# Patient Record
Sex: MALE | Race: WHITE | NOT HISPANIC OR LATINO | URBAN - METROPOLITAN AREA
[De-identification: names, ages, dates, MRNs, and addresses within clinical notes are randomized per-mention and may not be internally consistent; named-entity substitution may affect disease eponyms.]

---

## 2019-11-19 ENCOUNTER — HOSPITAL ENCOUNTER (OUTPATIENT)
Dept: RADIOLOGY | Facility: HOSPITAL | Age: 29
Discharge: HOME | End: 2019-11-19
Attending: INTERNAL MEDICINE

## 2022-06-29 ENCOUNTER — OCCUPATIONAL HEALTH (OUTPATIENT)
Dept: URGENT CARE | Facility: CLINIC | Age: 32
End: 2022-06-29

## 2022-06-29 PROCEDURE — 80305 DRUG TEST PRSMV DIR OPT OBS: CPT | Mod: S$GLB,,, | Performed by: EMERGENCY MEDICINE

## 2022-06-29 PROCEDURE — 80305 PR COLLECTION ONLY DRUG SCREEN: ICD-10-PCS | Mod: S$GLB,,, | Performed by: EMERGENCY MEDICINE

## 2023-12-28 ENCOUNTER — OFFICE VISIT (OUTPATIENT)
Dept: URGENT CARE | Facility: CLINIC | Age: 33
End: 2023-12-28
Payer: COMMERCIAL

## 2023-12-28 VITALS
WEIGHT: 241 LBS | TEMPERATURE: 100 F | DIASTOLIC BLOOD PRESSURE: 90 MMHG | SYSTOLIC BLOOD PRESSURE: 141 MMHG | HEART RATE: 118 BPM | HEIGHT: 70 IN | RESPIRATION RATE: 20 BRPM | BODY MASS INDEX: 34.5 KG/M2 | OXYGEN SATURATION: 96 %

## 2023-12-28 DIAGNOSIS — Z87.09 HISTORY OF ASTHMA: ICD-10-CM

## 2023-12-28 DIAGNOSIS — R06.02 SHORTNESS OF BREATH: Primary | ICD-10-CM

## 2023-12-28 DIAGNOSIS — Z87.01 HISTORY OF PNEUMONIA: ICD-10-CM

## 2023-12-28 DIAGNOSIS — J10.1 INFLUENZA B: ICD-10-CM

## 2023-12-28 LAB
CTP QC/QA: YES
CTP QC/QA: YES
FLUAV AG NPH QL: NEGATIVE
FLUBV AG NPH QL: POSITIVE
SARS-COV-2 AG RESP QL IA.RAPID: NEGATIVE

## 2023-12-28 PROCEDURE — 99205 OFFICE O/P NEW HI 60 MIN: CPT | Mod: 25,S$GLB,, | Performed by: STUDENT IN AN ORGANIZED HEALTH CARE EDUCATION/TRAINING PROGRAM

## 2023-12-28 PROCEDURE — 94640 AIRWAY INHALATION TREATMENT: CPT | Mod: S$GLB,,, | Performed by: STUDENT IN AN ORGANIZED HEALTH CARE EDUCATION/TRAINING PROGRAM

## 2023-12-28 PROCEDURE — 93000 ELECTROCARDIOGRAM COMPLETE: CPT | Mod: S$GLB,,, | Performed by: STUDENT IN AN ORGANIZED HEALTH CARE EDUCATION/TRAINING PROGRAM

## 2023-12-28 PROCEDURE — 94640 PR INHAL RX, AIRWAY OBST/DX SPUTUM INDUCT: ICD-10-PCS | Mod: S$GLB,,, | Performed by: STUDENT IN AN ORGANIZED HEALTH CARE EDUCATION/TRAINING PROGRAM

## 2023-12-28 PROCEDURE — 87804 INFLUENZA ASSAY W/OPTIC: CPT | Mod: QW,,, | Performed by: STUDENT IN AN ORGANIZED HEALTH CARE EDUCATION/TRAINING PROGRAM

## 2023-12-28 PROCEDURE — 87811 SARS-COV-2 COVID19 W/OPTIC: CPT | Mod: QW,S$GLB,, | Performed by: STUDENT IN AN ORGANIZED HEALTH CARE EDUCATION/TRAINING PROGRAM

## 2023-12-28 PROCEDURE — 93000 PR ELECTROCARDIOGRAM, COMPLETE: ICD-10-PCS | Mod: S$GLB,,, | Performed by: STUDENT IN AN ORGANIZED HEALTH CARE EDUCATION/TRAINING PROGRAM

## 2023-12-28 PROCEDURE — 87804 POCT INFLUENZA A/B: ICD-10-PCS | Mod: QW,,, | Performed by: STUDENT IN AN ORGANIZED HEALTH CARE EDUCATION/TRAINING PROGRAM

## 2023-12-28 PROCEDURE — 87811 SARS CORONAVIRUS 2 ANTIGEN POCT, MANUAL READ: ICD-10-PCS | Mod: QW,S$GLB,, | Performed by: STUDENT IN AN ORGANIZED HEALTH CARE EDUCATION/TRAINING PROGRAM

## 2023-12-28 PROCEDURE — 99205 PR OFFICE/OUTPT VISIT, NEW, LEVL V, 60-74 MIN: ICD-10-PCS | Mod: 25,S$GLB,, | Performed by: STUDENT IN AN ORGANIZED HEALTH CARE EDUCATION/TRAINING PROGRAM

## 2023-12-28 RX ORDER — PREDNISONE 20 MG/1
40 TABLET ORAL DAILY
Qty: 10 TABLET | Refills: 0 | Status: SHIPPED | OUTPATIENT
Start: 2023-12-29 | End: 2024-01-03

## 2023-12-28 RX ORDER — FAMOTIDINE 20 MG/1
20 TABLET, FILM COATED ORAL
COMMUNITY
Start: 2023-09-29 | End: 2024-09-28

## 2023-12-28 RX ORDER — AZITHROMYCIN 250 MG/1
TABLET, FILM COATED ORAL
Qty: 6 TABLET | Refills: 0 | Status: SHIPPED | OUTPATIENT
Start: 2023-12-28 | End: 2024-01-02

## 2023-12-28 RX ORDER — ALBUTEROL SULFATE 0.83 MG/ML
2.5 SOLUTION RESPIRATORY (INHALATION)
Status: COMPLETED | OUTPATIENT
Start: 2023-12-28 | End: 2023-12-28

## 2023-12-28 RX ORDER — IPRATROPIUM BROMIDE AND ALBUTEROL SULFATE 2.5; .5 MG/3ML; MG/3ML
3 SOLUTION RESPIRATORY (INHALATION) EVERY 6 HOURS PRN
Qty: 75 ML | Refills: 0 | Status: SHIPPED | OUTPATIENT
Start: 2023-12-28

## 2023-12-28 RX ORDER — IPRATROPIUM BROMIDE 0.5 MG/2.5ML
0.5 SOLUTION RESPIRATORY (INHALATION)
Status: COMPLETED | OUTPATIENT
Start: 2023-12-28 | End: 2023-12-28

## 2023-12-28 RX ORDER — PROMETHAZINE HYDROCHLORIDE AND DEXTROMETHORPHAN HYDROBROMIDE 6.25; 15 MG/5ML; MG/5ML
5 SYRUP ORAL
Qty: 118 ML | Refills: 0 | Status: SHIPPED | OUTPATIENT
Start: 2023-12-28 | End: 2024-01-07

## 2023-12-28 RX ORDER — ACETAMINOPHEN 500 MG
1000 TABLET ORAL
Status: COMPLETED | OUTPATIENT
Start: 2023-12-28 | End: 2023-12-28

## 2023-12-28 RX ORDER — CIPROFLOXACIN 500 MG/1
500 TABLET ORAL 2 TIMES DAILY
COMMUNITY
Start: 2023-10-13

## 2023-12-28 RX ADMIN — IPRATROPIUM BROMIDE 0.5 MG: 0.5 SOLUTION RESPIRATORY (INHALATION) at 02:12

## 2023-12-28 RX ADMIN — Medication 1000 MG: at 02:12

## 2023-12-28 RX ADMIN — ALBUTEROL SULFATE 2.5 MG: 0.83 SOLUTION RESPIRATORY (INHALATION) at 02:12

## 2023-12-28 NOTE — PROGRESS NOTES
"Subjective:      Patient ID: Jesse Francisco is a 33 y.o. male.    Vitals:  height is 5' 10" (1.778 m) and weight is 109.3 kg (241 lb). His temperature is 100.1 °F (37.8 °C). His blood pressure is 141/90 (abnormal) and his pulse is 118 (abnormal). His respiration is 20 and oxygen saturation is 96%.     Chief Complaint: Shortness of Breath    Patient is a 33-year-old male with a past medical history of childhood asthma, nephrolithiasis, and pneumonia who presents to clinic for evaluation of shortness a breath.  Patient reports symptoms intermittent over the past 3-4 days however states fairly constant as of this morning.  Patient reports no recent or known sick exposures however was at a [place] get together with family.  Patient states he believes the since and plugging triggered him to have worsening allergies.  Patient states that he started having congestion and postnasal drainage.  Patient states started taking over-the-counter medications such as antihistamines to get ahead of this.  Patient states woke up the following day feeling better however yesterday the symptoms returned.  Patient states that he has a history of bilateral pneumonia approximately 2 years ago and concerned that he may develop into this again. Patient states that he has experienced fatigue, chills, fever, nasal congestion with postnasal drainage, dry cough, shortness breast arrest, dyspnea on exertion, and headaches.  Patient states never really noticed high fever readings at home however noticed it was 102.1° F here.  Patient states has had chills at home.  Patient states that he has not had any ear pain or sore throat, chest pain or palpitations, leg swelling, wheezing, abdominal pain, nausea or vomiting, diarrhea, back pain or body aches, dizziness, or change in mentation.    Shortness of Breath  This is a new problem. The current episode started in the past 7 days (x's 3 days). The problem has been gradually worsening. Associated " symptoms include a fever (Temperature max 102.1° F) and headaches. Pertinent negatives include no abdominal pain, chest pain, ear pain, leg swelling, orthopnea, rash, sore throat, sputum production, vomiting or wheezing. Treatments tried: neb txts and inhalor. The treatment provided no relief. His past medical history is significant for asthma and bronchiolitis.       Constitution: Positive for chills, fatigue and fever (Temperature max 102.1° F).   HENT:  Positive for congestion and postnasal drip. Negative for ear pain and sore throat.    Neck: neck negative.   Cardiovascular:  Positive for sob on exertion. Negative for chest pain, leg swelling and palpitations.   Eyes: Negative.    Respiratory:  Positive for cough (Occasional) and shortness of breath. Negative for chest tightness, sputum production and wheezing.    Gastrointestinal: Negative.  Negative for abdominal pain, nausea, vomiting and diarrhea.   Endocrine: negative.   Genitourinary: Negative.    Musculoskeletal: Negative.  Negative for muscle ache.   Skin: Negative.  Negative for color change, pale, rash and erythema.   Allergic/Immunologic: Negative.    Neurological:  Positive for headaches. Negative for dizziness, light-headedness, passing out, disorientation and altered mental status.   Hematologic/Lymphatic: Negative.    Psychiatric/Behavioral: Negative.  Negative for altered mental status, disorientation and confusion.       Objective:     Physical Exam   Constitutional: He is oriented to person, place, and time. He appears well-developed. He is cooperative.  Non-toxic appearance. He appears ill. No distress.   HENT:   Head: Normocephalic and atraumatic.   Ears:   Right Ear: Hearing, tympanic membrane, external ear and ear canal normal.   Left Ear: Hearing, tympanic membrane, external ear and ear canal normal.   Nose: Congestion present. No mucosal edema, rhinorrhea or nasal deformity. No epistaxis. Right sinus exhibits no maxillary sinus tenderness  and no frontal sinus tenderness. Left sinus exhibits no maxillary sinus tenderness and no frontal sinus tenderness.   Mouth/Throat: Uvula is midline and mucous membranes are normal. Mucous membranes are moist. No trismus in the jaw. Normal dentition. No uvula swelling. Posterior oropharyngeal erythema present. No oropharyngeal exudate. Oropharynx is clear.   Eyes: Conjunctivae and lids are normal. Pupils are equal, round, and reactive to light. Right eye exhibits no discharge. Left eye exhibits no discharge. No scleral icterus.   Neck: Trachea normal and phonation normal. Neck supple. No neck rigidity present.   Cardiovascular: Regular rhythm, normal heart sounds and normal pulses. Tachycardia present.   Pulmonary/Chest: Effort normal. Tachypnea noted. No respiratory distress (Able to speak in short phrases.  Equal rise and fall of chest.  Mildly diminished breath sounds.). He has decreased breath sounds. He has no wheezes. He has no rhonchi. He has no rales.   Abdominal: Normal appearance and bowel sounds are normal. He exhibits no distension. Soft. There is no abdominal tenderness.   Musculoskeletal: Normal range of motion.         General: Normal range of motion.      Cervical back: He exhibits no tenderness.   Lymphadenopathy:     He has no cervical adenopathy.   Neurological: He is alert and oriented to person, place, and time. He exhibits normal muscle tone.   Skin: Skin is warm, dry, intact, not diaphoretic, not pale and no rash. Capillary refill takes 2 to 3 seconds. No erythema   Psychiatric: His speech is normal and behavior is normal. Judgment and thought content normal.   Nursing note and vitals reviewed.      Assessment:     1. Shortness of breath    2. Influenza B    3. History of pneumonia    4. History of asthma        Plan:       Shortness of breath  -     EKG 12-lead; Future  -     SARS Coronavirus 2 Antigen, POCT Manual Read  -     POCT Influenza A/B Rapid Antigen  -     XR CHEST PA AND LATERAL;  Future; Expected date: 12/28/2023    Influenza B    History of pneumonia  -     XR CHEST PA AND LATERAL; Future; Expected date: 12/28/2023    History of asthma  -     XR CHEST PA AND LATERAL; Future; Expected date: 12/28/2023    Other orders  -     acetaminophen tablet 1,000 mg  -     albuterol nebulizer solution 2.5 mg  -     ipratropium 0.02 % nebulizer solution 0.5 mg  -     azithromycin (Z-VIC) 250 MG tablet; Take 2 tablets by mouth on day 1; Take 1 tablet by mouth on days 2-5  Dispense: 6 tablet; Refill: 0  -     predniSONE (DELTASONE) 20 MG tablet; Take 2 tablets (40 mg total) by mouth once daily. for 5 days  Dispense: 10 tablet; Refill: 0  -     albuterol-ipratropium (DUO-NEB) 2.5 mg-0.5 mg/3 mL nebulizer solution; Take 3 mLs by nebulization every 6 (six) hours as needed for Wheezing or Shortness of Breath. Rescue  Dispense: 75 mL; Refill: 0  -     promethazine-dextromethorphan (PROMETHAZINE-DM) 6.25-15 mg/5 mL Syrp; Take 5 mLs by mouth every 4 to 6 hours as needed (Cough).  Dispense: 118 mL; Refill: 0                Labs: Influenza A negative.  Influenza B positive.  COVID negative.    Sinus tachycardia rate of 122 beats per minute.   MS, QRS 94 MS,  MS.  No acute STEMI identified.  Tylenol 1 g by mouth in clinic for fever.  Patient tolerated well.  No complications noted.    DuoNeb administered in clinic for tachypnea and shortness breath.  Patient tolerated well.  No complications noted.  Decadron 10 mg IM in clinic.  Patient tolerated well.  No complications noted.  Reassessment reveals improvement with vital signs and respiratory status.  Patient now able to speak in full complete sentences.  Patient states feels 1000 times better.  Patient states has not breathe this well in multiple weeks.  Radiology not available in clinic at current.  Patient provided with stat outpatient chest x-ray order.    Nose recommended patient present to emergency department for dyspnea evaluation however patient  refused.  Patient reports that he does not feel symptoms are to this extent.  Patient states that he feels much better and back to his normal after treatment in clinic.  Risk versus benefits including possibility of death explained to patient.  Patient has clear verbal understanding.  AMA form signed.  Quarantine as directed.  Work excuse provided.    Tylenol/Motrin per package instructions for any pain or fever.    Assure adequate hydration.    Follow-up with PCP in 1-2 days.    Return to clinic as needed.    To ED for any continued, new, or acutely worsening symptoms.  Patient states if symptoms returned or got worse he would present to the emergency department at that time.    Patient in agreement with plan of care.    DISCLAIMER: Please note that my documentation in this Electronic Healthcare Record was produced using speech recognition software and therefore may contain errors related to that software system.These could include grammar, punctuation and spelling errors or the inclusion/exclusion of phrases that were not intended. Garbled syntax, mangled pronouns, and other bizarre constructions may be attributed to that software system.